# Patient Record
Sex: FEMALE | Race: WHITE
[De-identification: names, ages, dates, MRNs, and addresses within clinical notes are randomized per-mention and may not be internally consistent; named-entity substitution may affect disease eponyms.]

---

## 2018-08-20 ENCOUNTER — HOSPITAL ENCOUNTER (OUTPATIENT)
Dept: HOSPITAL 15 - LAB | Age: 73
Discharge: HOME | End: 2018-08-20
Attending: SPECIALIST
Payer: MEDICARE

## 2018-08-20 DIAGNOSIS — I50.9: ICD-10-CM

## 2018-08-20 DIAGNOSIS — R10.2: ICD-10-CM

## 2018-08-20 DIAGNOSIS — N39.0: Primary | ICD-10-CM

## 2018-08-20 PROCEDURE — 87086 URINE CULTURE/COLONY COUNT: CPT

## 2018-08-20 PROCEDURE — 84443 ASSAY THYROID STIM HORMONE: CPT

## 2018-08-20 PROCEDURE — 36415 COLL VENOUS BLD VENIPUNCTURE: CPT

## 2018-12-03 ENCOUNTER — HOSPITAL ENCOUNTER (OUTPATIENT)
Dept: HOSPITAL 15 - LAB | Age: 73
Discharge: HOME | End: 2018-12-03
Attending: SPECIALIST
Payer: MEDICARE

## 2018-12-03 DIAGNOSIS — N28.9: ICD-10-CM

## 2018-12-03 DIAGNOSIS — E11.9: ICD-10-CM

## 2018-12-03 DIAGNOSIS — R60.9: ICD-10-CM

## 2018-12-03 DIAGNOSIS — N39.0: Primary | ICD-10-CM

## 2018-12-03 DIAGNOSIS — I50.30: ICD-10-CM

## 2018-12-03 DIAGNOSIS — I25.10: ICD-10-CM

## 2018-12-03 DIAGNOSIS — I11.0: ICD-10-CM

## 2018-12-03 LAB
ANION GAP SERPL CALCULATED.3IONS-SCNC: 4 MMOL/L (ref 5–15)
BUN SERPL-MCNC: 23 MG/DL (ref 7–18)
BUN/CREAT SERPL: 19.2
CALCIUM SERPL-MCNC: 8.5 MG/DL (ref 8.5–10.1)
CHLORIDE SERPL-SCNC: 106 MMOL/L (ref 98–107)
CO2 SERPL-SCNC: 30 MMOL/L (ref 21–32)
GLUCOSE SERPL-MCNC: 173 MG/DL (ref 74–106)
POTASSIUM SERPL-SCNC: 4.4 MMOL/L (ref 3.5–5.1)
SODIUM SERPL-SCNC: 140 MMOL/L (ref 136–145)

## 2018-12-03 PROCEDURE — 80048 BASIC METABOLIC PNL TOTAL CA: CPT

## 2018-12-03 PROCEDURE — 87086 URINE CULTURE/COLONY COUNT: CPT

## 2018-12-03 PROCEDURE — 36415 COLL VENOUS BLD VENIPUNCTURE: CPT

## 2018-12-03 PROCEDURE — 83036 HEMOGLOBIN GLYCOSYLATED A1C: CPT

## 2019-09-18 ENCOUNTER — HOSPITAL ENCOUNTER (EMERGENCY)
Dept: HOSPITAL 15 - ER | Age: 74
Discharge: HOME | End: 2019-09-18
Payer: MEDICARE

## 2019-09-18 VITALS — SYSTOLIC BLOOD PRESSURE: 136 MMHG | DIASTOLIC BLOOD PRESSURE: 61 MMHG

## 2019-09-18 VITALS — BODY MASS INDEX: 32.1 KG/M2 | HEIGHT: 64 IN | WEIGHT: 188 LBS
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
## 2019-09-18 DIAGNOSIS — E11.9: ICD-10-CM
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
## 2019-09-18 DIAGNOSIS — S43.401A: Primary | ICD-10-CM
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
## 2019-09-18 DIAGNOSIS — Z86.73: ICD-10-CM
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
## 2019-09-18 DIAGNOSIS — Z95.1: ICD-10-CM

## 2019-09-18 DIAGNOSIS — Z79.899: ICD-10-CM

## 2019-09-18 LAB
ALBUMIN SERPL-MCNC: 3.3 G/DL (ref 3.4–5)
ALP SERPL-CCNC: 63 U/L (ref 45–117)
ALT SERPL-CCNC: 75 U/L (ref 13–56)
ANION GAP SERPL CALCULATED.3IONS-SCNC: 7 MMOL/L (ref 5–15)
BILIRUB SERPL-MCNC: 0.1 MG/DL (ref 0.2–1)
BUN SERPL-MCNC: 21 MG/DL (ref 7–18)
BUN/CREAT SERPL: 14.1
CALCIUM SERPL-MCNC: 9.1 MG/DL (ref 8.5–10.1)
CHLORIDE SERPL-SCNC: 108 MMOL/L (ref 98–107)
CO2 SERPL-SCNC: 26 MMOL/L (ref 21–32)
GLUCOSE SERPL-MCNC: 173 MG/DL (ref 74–106)
HCT VFR BLD AUTO: 44.9 % (ref 36–46)
HGB BLD-MCNC: 15.1 G/DL (ref 12.2–16.2)
INR PPP: 1.03 (ref 0.9–1.15)
MCH RBC QN AUTO: 33.1 PG (ref 28–32)
MCV RBC AUTO: 98.5 FL (ref 80–100)
NRBC BLD QL AUTO: 0.3 %
POTASSIUM SERPL-SCNC: 3.3 MMOL/L (ref 3.5–5.1)
PROT SERPL-MCNC: 7 G/DL (ref 6.4–8.2)
SODIUM SERPL-SCNC: 141 MMOL/L (ref 136–145)

## 2019-09-18 PROCEDURE — 71045 X-RAY EXAM CHEST 1 VIEW: CPT

## 2019-09-18 PROCEDURE — 80053 COMPREHEN METABOLIC PANEL: CPT

## 2019-09-18 PROCEDURE — 73030 X-RAY EXAM OF SHOULDER: CPT

## 2019-09-18 PROCEDURE — 82962 GLUCOSE BLOOD TEST: CPT

## 2019-09-18 PROCEDURE — 72125 CT NECK SPINE W/O DYE: CPT

## 2019-09-18 PROCEDURE — 85025 COMPLETE CBC W/AUTO DIFF WBC: CPT

## 2019-09-18 PROCEDURE — 84484 ASSAY OF TROPONIN QUANT: CPT

## 2019-09-18 PROCEDURE — 85610 PROTHROMBIN TIME: CPT

## 2019-09-18 PROCEDURE — 99284 EMERGENCY DEPT VISIT MOD MDM: CPT

## 2019-09-18 PROCEDURE — 36415 COLL VENOUS BLD VENIPUNCTURE: CPT

## 2019-09-18 PROCEDURE — 96374 THER/PROPH/DIAG INJ IV PUSH: CPT

## 2025-03-28 ENCOUNTER — HOSPITAL ENCOUNTER (EMERGENCY)
Dept: HOSPITAL 15 - ER | Age: 80
Discharge: HOME | End: 2025-03-28
Payer: COMMERCIAL

## 2025-03-28 VITALS
RESPIRATION RATE: 14 BRPM | OXYGEN SATURATION: 88 % | DIASTOLIC BLOOD PRESSURE: 80 MMHG | TEMPERATURE: 98.4 F | HEART RATE: 70 BPM | SYSTOLIC BLOOD PRESSURE: 117 MMHG

## 2025-03-28 VITALS — WEIGHT: 156.31 LBS | BODY MASS INDEX: 26.04 KG/M2 | HEIGHT: 65 IN

## 2025-03-28 VITALS — HEART RATE: 70 BPM | RESPIRATION RATE: 16 BRPM | OXYGEN SATURATION: 97 %

## 2025-03-28 DIAGNOSIS — I25.10: ICD-10-CM

## 2025-03-28 DIAGNOSIS — Z79.82: ICD-10-CM

## 2025-03-28 DIAGNOSIS — Z79.899: ICD-10-CM

## 2025-03-28 DIAGNOSIS — R09.89: ICD-10-CM

## 2025-03-28 DIAGNOSIS — Z86.73: ICD-10-CM

## 2025-03-28 DIAGNOSIS — J32.9: Primary | ICD-10-CM

## 2025-03-28 DIAGNOSIS — Z79.4: ICD-10-CM

## 2025-03-28 DIAGNOSIS — E11.9: ICD-10-CM

## 2025-03-28 DIAGNOSIS — Z95.1: ICD-10-CM

## 2025-03-28 LAB
ALBUMIN SERPL-MCNC: 3.8 G/DL (ref 3.2–4.8)
ALP SERPL-CCNC: 55 U/L (ref 46–116)
ALT SERPL-CCNC: 36 U/L (ref 7–40)
ANION GAP SERPL CALCULATED.3IONS-SCNC: 7 MMOL/L (ref 5–15)
BILIRUB SERPL-MCNC: 0.5 MG/DL (ref 0.2–1)
BUN SERPL-MCNC: 18 MG/DL (ref 9–23)
BUN/CREAT SERPL: 14.9 (ref 10–20)
CALCIUM SERPL-MCNC: 9 MG/DL (ref 8.7–10.4)
CHLORIDE SERPL-SCNC: 106 MMOL/L (ref 98–107)
CO2 SERPL-SCNC: 28 MMOL/L (ref 20–31)
GLUCOSE SERPL-MCNC: 103 MG/DL (ref 74–106)
HCT VFR BLD AUTO: 44.3 % (ref 36–46)
HGB BLD-MCNC: 14.4 G/DL (ref 12.2–16.2)
MCH RBC QN AUTO: 32.7 PG (ref 28–32)
MCV RBC AUTO: 100.7 FL (ref 80–100)
NRBC BLD QL AUTO: 0.2 %
POTASSIUM SERPL-SCNC: 4.3 MMOL/L (ref 3.5–5.1)
PROT SERPL-MCNC: 6.5 G/DL (ref 5.7–8.2)
SODIUM SERPL-SCNC: 141 MMOL/L (ref 136–145)

## 2025-03-28 PROCEDURE — 36415 COLL VENOUS BLD VENIPUNCTURE: CPT

## 2025-03-28 PROCEDURE — 85025 COMPLETE CBC W/AUTO DIFF WBC: CPT

## 2025-03-28 PROCEDURE — 71045 X-RAY EXAM CHEST 1 VIEW: CPT

## 2025-03-28 PROCEDURE — 80053 COMPREHEN METABOLIC PANEL: CPT

## 2025-03-28 PROCEDURE — 70486 CT MAXILLOFACIAL W/O DYE: CPT

## 2025-03-28 NOTE — [facility]
HISTORY: sinusitis/facial pain



TECHNIQUE:  Nonenhanced axial images through the facial bones with coronal and sagittal MPR.



Radiation Dose Information:



CT Dose: CTDI volume is 56.7 mGy. Dose-length product is 1121.33 mGy*cm



COMPARISON: None



FINDINGS:



Mandible:     Unremarkable



Maxilla:  Unremarkable



Zygomatic arches:  Unremarkable



Nasal bone:  Unremarkable



Orbits:  Unremarkable



Sinuses:   Moderate mucoperiosteal thickening of the ethmoid air cells bilaterally. Mild mucoperioste
al changes in the maxillary sinuses



No air-fluid levels present. No destructive changes seen of the calvarium.



Facial swelling:  None



IMPRESSION: 



1. No acute facial fractures.



Moderate bilateral ethmoid sinusitis.



Normal temporal bones. 



Radiation optimization: All CT scans at this facility use at least one of these dose optimization crista
hniques: automated exposure control  mA and/or kV adjustment per patient size (includes targeted exam
s where dose is matched to clinical indication)  or iterative reconstruction.



Electronically Signed by: Nash Rojo at 03/28/2025 17:39:44 PM

## 2025-03-28 NOTE — [facility]
EXAM: XY CHEST XRAY 1 VIEW



Indication: cough



Technique: Single frontal view of the chest was obtained



Comparison: None



FINDINGS: 



Lines and Tubes: None



Lungs: No focal consolidation. Mild pulmonary vascular congestion.



Pleura: No effusion.



No pneumothorax. 



Cardiomediastinal contours: Mild cardiomegaly.



Bones: No acute osseous abnormality.



IMPRESSION:



Mild cardiomegaly with mild pulmonary vascular congestion.



Electronically Signed by: Laeh Mixon at 03/28/2025 17:49:50 PM

## 2025-03-28 NOTE — ED.PDOC
History of Present Illness


HPI Comments


80Y F with PMHx DM, CAD, CABG, TIA, and sinusitis presents to ED for chief 

complaint sinus pain x3days. Pt also states that her balance "is more off" than 

usual and she feels dripping behind her throat. Pt states she attempts to blow 

nose and cough but nothing comes up. Pt denies dizziness, chest pain, and SOB. 

No other symptoms/history reported.


Chief Complaint:  General Weakness


Time Seen by MD:  15:30


Primary Care Provider:  DENIES


Reviewed Notes:  Nurses Notes, Medications, Allergies


Allergies:  


Coded Allergies:  


     NO KNOWN ALLERGIES (Unverified , 19)


Home Meds


Reported Medications


Insulin Glargine (Lantus Solostar) Solostar Inj, 20 UNITS SUBCUT HS, #15


   14


Timolol Maleate (Ophth) (Timolol Maleate) 0.5 % Sol, #10


   14


Rosuvastatin Calcium (Crestor) 20 Mg Tab, HS, #90


   14


Aspirin (Aspirin Ec Low Dose) 81 Mg Tab, DAILY, #90


   14


Clopidogrel Bisulfate (CLOPIDOGREL) 75 Mg Tab, HS, #90


   14


Ranolazine (Ranexa) 1,000 Mg Tab, BID, #170


   14


Carvedilol (Carvedilol) 12.5 Mg Tab, BID, #180


   14


Information Source:  Patient


Mode of Arrival:  Ambulatory


Severity:  Mild


Timing:  Days


Duration:  Since onset


Prehospital treatment:  None





Past Medical History


PAST MEDICAL HISTORY:  CAD, DM, TIA


Surgical History:  CABG


GYN History:  No Pertinent GYN History





Family History


Family History:  No family hx of DM, Unobtainable





Social History


Smoker:  Non-Smoker


Alcohol:  Rarely


Drugs:  Denies Drug Use


Lives In:  Home





Constitutional:  denies: chills, diaphoresis, fatigue, fever, malaise, sweats, 

weakness, others


EENTM:  reports: nose congestion, others (sinus pain); denies: blurred vision, 

double vision, ear bleeding, ear discharge, ear drainage, ear pain, ear ringing,

eye pain, eye redness, hearing loss, mouth pain, mouth swelling, nasal 

discharge, nose bleeding, nose pain, photophobia, tearing, throat pain, throat 

swelling, voice changes


Respiratory:  denies: cough, hemoptysis, orthopnea, SOB at rest, shortness of 

breath, SOB with excertion, stridor, wheezing, others


Cardiovascular:  denies: chest pain, dizzy spells, diaphoresis, Dyspnea on 

exertion, edema, irregular heart beat, left arm pain, lightheadedness, 

palpitations, PND, syncope, others


Gastrointestinal:  denies: abdomen distended, abdominal pain, blood streaked 

bowels, constipated, diarrhea, dysphagia, difficulty swallowing, hematemesis, 

melena, nausea, poor appetite, poor fluid intake, rectal bleeding, rectal pain, 

vomiting, others


Genitourinary:  denies: abnormal vagina bleeding, burning, dyspareunia, dysuria,

flank pain, frequency, hematuria, incontinence, pain, pregnant, vagina 

discharge, urgency, others


Neurological:  denies: dizziness, fainting, headache, left sided numbness, left 

sided weakness, numbness, paresthesia, pre-existing deficit, right sided 

numbness, right sided weakness, seizure, speech problems, tingling, tremors, 

weakness, others


Musculoskeletal:  denies: back pain, gout, joint pain, joint swelling, muscle 

pain, muscle stiffness, neck pain, others


Integumetry:  denies: bruises, change in color, change in hair/nails, dryness, 

laceration, lesions, lumps, rash, wounds, others


Allergic/Immunocompromised:  denies: Difficulty Healing, Frequent Infections, 

Hives, Itching, others


Hematologic/Lymphatic:  denies: anemia, blood clots, easy bleeding, easy 

bruising, swollen glands, others


Endocrine:  denies: excessive hunger, excessive sweating, excessive thirst, 

excessive urination, flushing, intolerance to cold, intolerance to heat, 

unexplained weight gain, unexplained weight loss, others


Psychiatric:  denies: anxiety, bipolar disorder, depression, hopeless, panic 

disorder, schizophrenia, sleepless, suicidal, others


All Other Systems:  Reviewed and Negative





Physical Exam


General Appearance:  No Apparent Distress, Normal


HEENT:  Sinuses (tender)


Neck:  Full Range of Motion, Non-Tender, Normal, Normal Inspection


Respiratory:  Chest Non-Tender, Lungs Clear, No Accessory Muscle Use, No 

Respiratory Distress, Normal Breath Sounds


Cardiovascular:  No Edema, No Murmur, No Gallop, Normal Peripheral Pulses, 

Regular Rate/Rhythm


Breast Exam:  Deferred


Gastrointestinal:  No Organomegaly, Non Tender, Normal Bowel Sounds, Soft


Genitalia:  Deferred


Pelvic:  Deferred


Rectal:  Deferred


Extremities:  Normal capillary refill, Normal inspection, Normal range of 

motion, Non-tender


Musculoskeletal :  


   Apperance:  Normal


Neurologic:  Alert, CNs II-XII nml as Tested, No Motor Deficits, Normal Affect, 

Normal Mood, No Sensory Deficits


Cerebellar Function:  NOT DONE


Reflexes:  NOT DONE


Skin:  Dry, Normal Color, Warm


Lymphatic:  NOT DONE





Was a procedure done?


Was a procedure done?:  No





Differential Dx


Considerations may include:


sinusitis





X-Ray, Labs, Meds, VS





                                   Vital Signs








  Date Time  Temp Pulse Resp B/P (MAP) Pulse Ox O2 Delivery O2 Flow Rate FiO2


 


3/28/25 19:15  74 16 125/70 (88) 98   


 


3/28/25 17:30  70 16  97 Room Air* 0 21


 


3/28/25 16:49   16  99 Room Air* 0 21


 


3/28/25 16:49 97.3 74 16 118/71 (87) 99   





 97.3       








                                       Lab








Test


 3/28/25


16:45 Range/Units


 


 


White Blood Count


 2.3 L


 4.4-10.8


10^3/uL


 


Red Blood Count


 4.40 


 4.0-5.20


10^6/uL


 


Hemoglobin 14.4  12.2-16.2  g/dL


 


Hematocrit 44.3  36.0-46.0  %


 


Mean Corpuscular Volume 100.7 H 80.0-100.0  fL


 


Mean Corpuscular Hemoglobin 32.7 H 28.0-32.0  pg


 


Mean Corpuscular Hemoglobin


Concent 32.5 


 32.0-36.0  g/dL





 


Red Cell Distribution Width 13.9  11.8-14.3  %


 


Platelet Count


 145 


 140-450


10^3/uL


 


Mean Platelet Volume 8.5  6.9-10.8  fL


 


Neutrophils (%) (Auto) 34.4 L 37.0-80.0  %


 


Lymphocytes (%) (Auto) 47.9  10.0-50.0  %


 


Monocytes (%) (Auto) 15.0 H 0.0-12.0  %


 


Eosinophils (%) (Auto) 2.3  0.0-7.0  %


 


Basophils (%) (Auto) 0.4  0.0-2.0  %


 


Neutrophils # (Auto)


 0.8 L


 1.6-8.6  10


^3/uL


 


Lymphocytes # (Auto)


 1.1 


 0.4-5.4  10


^3/uL


 


Monocytes # (Auto) 0.3  0-1.3  10 ^3/uL


 


Eosinophils # (Auto) 0.1  0-0.8  10 ^3/uL


 


Basophils # (Auto) 0  0-0.2  10 ^3/uL


 


Nucleated Red Blood Cells 0.2   %


 


Sodium Level 141  136-145  mmol/L


 


Potassium Level 4.3  3.5-5.1  mmol/L


 


Chloride Level 106    mmol/L


 


Carbon Dioxide Level 28  20-31  mmol/L


 


Anion Gap 7  5-15  


 


Blood Urea Nitrogen 18  9-23  mg/dL


 


Creatinine


 1.21 H


 0.550-1.02


mg/dL


 


Glomerular Filtration Rate


Calc 45 


 >90  mL/min





 


BUN/Creatinine Ratio 14.9  10.0-20.0  


 


Serum Glucose 103    mg/dL


 


Calcium Level 9.0  8.7-10.4  mg/dL


 


Total Bilirubin 0.5  0.2-1.0  mg/dL


 


Aspartate Amino Transferase


(AST) 32 


 13-40  U/L





 


Alanine Aminotransferase (ALT) 36  7-40  U/L


 


Alkaline Phosphatase 55    U/L


 


Total Protein 6.5  5.7-8.2  g/dL


 


Albumin 3.8  3.2-4.8  g/dL





[place] [address]


                              Ph: [phone]





                               DIAGNOSTIC IMAGING


                      Diagnostic Imaging Report : [id number]


                                     Signed








PATIENT: LAMONTE LOPEZCCT: [id number]        UNIT: [id number]


: 1945           LOC: ER                   ROOM / BED:  / 


AGE / SEX: 80 / F         ADM STATUS: REG ER        SERVICE DT: 25 [id number]





ORDERING PHYSICIAN: EDI KRISHNAN MD


PROCEDURE(s): FAC2C - MAXILLOFACIAL WITHOUT


REASON: sinusitis/facial pain 


ORDER NUMBER(s): [id number], ACCESSION NUMBER(s): [id number]








HISTORY: sinusitis/facial pain





TECHNIQUE:  Nonenhanced axial images through the facial bones with coronal and 

sagittal MPR.





Radiation Dose Information:





CT Dose: CTDI volume is 56.7 mGy. Dose-length product is 1121.33 mGy*cm





COMPARISON: None





FINDINGS:





Mandible:     Unremarkable





Maxilla:  Unremarkable





Zygomatic arches:  Unremarkable





Nasal bone:  Unremarkable





Orbits:  Unremarkable





Sinuses:   Moderate mucoperiosteal thickening of the ethmoid air cells 

bilaterally. Mild mucoperiosteal changes in the maxillary sinuses





No air-fluid levels present. No destructive changes seen of the calvarium.





Facial swelling:  None





IMPRESSION: 





1. No acute facial fractures.





Moderate bilateral ethmoid sinusitis.





Normal temporal bones. 





Radiation optimization: All CT scans at this facility use at least one of these 

dose optimization techniques: automated exposure control  mA and/or kV 

adjustment per patient size (includes targeted exams where dose is matched to 

clinical indication)  or iterative reconstruction.





Electronically Signed by: Phillip Lozano at 2025 17:39:44 PM











DICTATED BY: PHILLIP LOZANO MD


DICTATED DATE/TIME: 25





SIGNED BY: PHILLIP LOZANO MD


SIGNED DATE/TIME: 25





CC: 


                             [place] [address]


                              Ph: [phone]





                               DIAGNOSTIC IMAGING


                      Diagnostic Imaging Report : [id number]


                                     Signed








PATIENT: LAMONTE LOPEZCCT: [id number]        UNIT: [id number]


: 1945           LOC: ER                   ROOM / BED:  / 


AGE / SEX: 80 / F         ADM STATUS: REG ER        SERVICE DT: 25





ORDERING PHYSICIAN: EDI KRISHNAN MD


PROCEDURE(s): CXR1 - CHEST XRAY 1 VIEW


REASON: cough


ORDER NUMBER(s): [id number], ACCESSION NUMBER(s): [id number]








EXAM: XY CHEST XRAY 1 VIEW





Indication: cough





Technique: Single frontal view of the chest was obtained





Comparison: None





FINDINGS: 





Lines and Tubes: None





Lungs: No focal consolidation. Mild pulmonary vascular congestion.





Pleura: No effusion.





No pneumothorax. 





Cardiomediastinal contours: Mild cardiomegaly.





Bones: No acute osseous abnormality.





IMPRESSION:





Mild cardiomegaly with mild pulmonary vascular congestion.





Electronically Signed by: Leah Mixon at 2025 17:49:50 PM











DICTATED BY: LEAH MIXON MD


DICTATED DATE/TIME: 25





SIGNED BY: LEAH MIXON MD


SIGNED DATE/TIME: 25





CC: 


The patient will be prescribed Augmentin.


Time of 1ST Reevaluation:  16:00


Reevaluation 1ST:  Unchanged


Patient Education/Counseling:  Diagnosis, Treatment


Family Education/Counseling:  No Family Present





Departure 1


Departure


Time of Disposition:  20:55


Impression:  


   Primary Impression:  


   Sinusitis


   Qualified Codes:  J01.91 - Acute recurrent sinusitis, unspecified


   Additional Impression:  


   Pulmonary vascular congestion


Disposition:   HOME / SELF CARE / HOMELESS


Condition:  Stable





Additional Instructions:  


Reassessed patient, vital signs stable. Denies any new symptoms. Patient is able

to tolerate PO and ambulate/be mobile at their baseline without concern. Risks 

and benefits of all medications given or prescribed, if any, discussed. All lab 

work, imaging and diagnostic studies were reviewed by me. The patient was 

counseled extensively on my clinical impression, diagnosis, expected course of 

the disease, and plan, including their follow-up care. Will discharge patient. 





Patient instructed to follow up with Primary Care Physician within 24-48 hours. 

Strict return precautions given for further exacerbation of symptoms or for new 

symptoms. The patient was given the opportunity to ask questions and all 

questions were answered by myself and the nursing/tech staff. Patient is in 

agreement with the care plan. The patient verbally expressed understanding of 

the discharge instructions, including the reasons to return to the Emergency 

Department.


e-Prescriptions


Amoxicillin & Pot Clavulanate (AUGMENTIN TABLET) 875 Mg Tb


875 MG PO BID, #20 TAB


   Prov: EKLLY LADD MD         3/28/25


Discharged With:  Self





Critical Care Note


Critical Care Time?:  No





Stability


Stability form required:  No





Heart Score


Heart Score:  








Heart Score Response (Comments) Value


 


History N/A 0


 


EKG N/A 0


 


Age N/A 0


 


Risk Factors N/A 0


 


Troponin N/A 0


 


Total  0














I personally scribed for EDI KRISHNAN MD ([email or web address]) on 3/28/25 at 15:51.  

Electronically submitted by Azalia Randall ([email or web address]).





EDI KRISHNAN MD                 Mar 28, 2025 15:51


KELLY LADD MD             Mar 28, 2025 20:59